# Patient Record
Sex: MALE | Race: BLACK OR AFRICAN AMERICAN | NOT HISPANIC OR LATINO | URBAN - METROPOLITAN AREA
[De-identification: names, ages, dates, MRNs, and addresses within clinical notes are randomized per-mention and may not be internally consistent; named-entity substitution may affect disease eponyms.]

---

## 2021-06-14 ENCOUNTER — OFFICE VISIT (OUTPATIENT)
Dept: URBAN - METROPOLITAN AREA CLINIC 25 | Facility: CLINIC | Age: 21
End: 2021-06-14

## 2021-06-14 NOTE — HPI-TODAY'S VISIT:
June 2021 visit: EGD in November 2014 was completely normal.  Path revealed nonspecific duodenitis, mild esophagitis with increased eosinophils in distal esophagus as well as proximal esophagus, no H. pylori identified.  Gastric emptying study in 2014 was abnormal by approximately 40 more minutes.  Barium swallow in 2014 revealed somewhat delayed gastric emptying but normal esophagus.

## 2022-08-17 ENCOUNTER — HOSPITAL ENCOUNTER (OUTPATIENT)
Dept: HOSPITAL 5 - ED | Age: 22
Setting detail: OBSERVATION
LOS: 1 days | Discharge: NURSING, REHAB OR LONG-TERM CARE | End: 2022-08-18
Attending: INTERNAL MEDICINE | Admitting: INTERNAL MEDICINE
Payer: MEDICARE

## 2022-08-17 DIAGNOSIS — E66.9: ICD-10-CM

## 2022-08-17 DIAGNOSIS — I10: ICD-10-CM

## 2022-08-17 DIAGNOSIS — I95.9: ICD-10-CM

## 2022-08-17 DIAGNOSIS — F29: ICD-10-CM

## 2022-08-17 DIAGNOSIS — F31.9: ICD-10-CM

## 2022-08-17 DIAGNOSIS — J45.909: ICD-10-CM

## 2022-08-17 DIAGNOSIS — F20.9: ICD-10-CM

## 2022-08-17 DIAGNOSIS — Z79.899: ICD-10-CM

## 2022-08-17 DIAGNOSIS — Z20.822: ICD-10-CM

## 2022-08-17 DIAGNOSIS — A41.9: Primary | ICD-10-CM

## 2022-08-17 DIAGNOSIS — J18.9: ICD-10-CM

## 2022-08-17 DIAGNOSIS — Z98.890: ICD-10-CM

## 2022-08-17 LAB
ALBUMIN SERPL-MCNC: 2.9 G/DL (ref 3.9–5)
ALT SERPL-CCNC: 16 UNITS/L (ref 7–56)
BASOPHILS # (AUTO): 0.1 K/MM3 (ref 0–0.1)
BASOPHILS NFR BLD AUTO: 0.4 % (ref 0–1.8)
BUN SERPL-MCNC: 8 MG/DL (ref 9–20)
BUN/CREAT SERPL: 8 %
CALCIUM SERPL-MCNC: 8.8 MG/DL (ref 8.4–10.2)
EOSINOPHIL # BLD AUTO: 0.1 K/MM3 (ref 0–0.4)
EOSINOPHIL NFR BLD AUTO: 0.7 % (ref 0–4.3)
HCT VFR BLD CALC: 36.4 % (ref 35.5–45.6)
HEMOLYSIS INDEX: 75
HGB BLD-MCNC: 12.2 GM/DL (ref 11.8–15.2)
LYMPHOCYTES # BLD AUTO: 1.6 K/MM3 (ref 1.2–5.4)
LYMPHOCYTES NFR BLD AUTO: 11.3 % (ref 13.4–35)
MCHC RBC AUTO-ENTMCNC: 34 % (ref 32–34)
MCV RBC AUTO: 86 FL (ref 84–94)
MONOCYTES # (AUTO): 1.4 K/MM3 (ref 0–0.8)
MONOCYTES % (AUTO): 10 % (ref 0–7.3)
PLATELET # BLD: 158 K/MM3 (ref 140–440)
RBC # BLD AUTO: 4.25 M/MM3 (ref 3.65–5.03)

## 2022-08-17 PROCEDURE — 87502 INFLUENZA DNA AMP PROBE: CPT

## 2022-08-17 PROCEDURE — U0003 INFECTIOUS AGENT DETECTION BY NUCLEIC ACID (DNA OR RNA); SEVERE ACUTE RESPIRATORY SYNDROME CORONAVIRUS 2 (SARS-COV-2) (CORONAVIRUS DISEASE [COVID-19]), AMPLIFIED PROBE TECHNIQUE, MAKING USE OF HIGH THROUGHPUT TECHNOLOGIES AS DESCRIBED BY CMS-2020-01-R: HCPCS

## 2022-08-17 PROCEDURE — 86901 BLOOD TYPING SEROLOGIC RH(D): CPT

## 2022-08-17 PROCEDURE — 81001 URINALYSIS AUTO W/SCOPE: CPT

## 2022-08-17 PROCEDURE — 96365 THER/PROPH/DIAG IV INF INIT: CPT

## 2022-08-17 PROCEDURE — 80178 ASSAY OF LITHIUM: CPT

## 2022-08-17 PROCEDURE — 36415 COLL VENOUS BLD VENIPUNCTURE: CPT

## 2022-08-17 PROCEDURE — 86900 BLOOD TYPING SEROLOGIC ABO: CPT

## 2022-08-17 PROCEDURE — 71045 X-RAY EXAM CHEST 1 VIEW: CPT

## 2022-08-17 PROCEDURE — 80164 ASSAY DIPROPYLACETIC ACD TOT: CPT

## 2022-08-17 PROCEDURE — 80053 COMPREHEN METABOLIC PANEL: CPT

## 2022-08-17 PROCEDURE — 87040 BLOOD CULTURE FOR BACTERIA: CPT

## 2022-08-17 PROCEDURE — G0378 HOSPITAL OBSERVATION PER HR: HCPCS

## 2022-08-17 PROCEDURE — 82140 ASSAY OF AMMONIA: CPT

## 2022-08-17 PROCEDURE — 96361 HYDRATE IV INFUSION ADD-ON: CPT

## 2022-08-17 PROCEDURE — 85025 COMPLETE CBC W/AUTO DIFF WBC: CPT

## 2022-08-17 PROCEDURE — 99284 EMERGENCY DEPT VISIT MOD MDM: CPT

## 2022-08-17 PROCEDURE — 86850 RBC ANTIBODY SCREEN: CPT

## 2022-08-17 NOTE — EMERGENCY DEPARTMENT REPORT
HPI





- General


Chief Complaint: Fever


Time Seen by Provider: 08/17/22 11:53





- HPI


HPI: 





Room 8








Patient is a 22-year-old male present with a chief complaint of fever.  Patient 

is currently at [facility] secondary to schizophrenia with persistent disor

ganized thought process.  Patient was found to develop a fever yesterday and has

been given Tylenol several times.  Patient was sent to the ED for further 

evaluation patient's fever.  When asked how the patient is feeling he states he 

feels "depleted."  The patient admits to a slight cough that is productive of 

yellow sputum.  Patient admits to rhinorrhea and nausea without vomiting.  The 

patient states he has been vaccinated against COVID receiving 2 doses





ED Past Medical Hx





- Past Medical History


Hx Hypertension: Yes


Hx Psychiatric Treatment: Yes (Schizophrenia, bipolar disorder)


Hx Asthma: Yes





- Surgical History


Additional Surgical History: Rotator cuff surgery, arch repair bilateral feet, 

T&A





ED Review of Systems


ROS: 


Stated complaint: FEVER


Other details as noted in HPI





Constitutional: fever, malaise


Eyes: denies: eye pain


ENT: denies: throat pain


Respiratory: cough


Cardiovascular: denies: chest pain


Endocrine: no symptoms reported


Gastrointestinal: nausea.  denies: vomiting


Genitourinary: denies: dysuria


Musculoskeletal: denies: back pain


Neurological: headache





Physical Exam





- Physical Exam


Vital Signs: 


                                   Vital Signs











  08/17/22





  11:43


 


Temperature 104.1 F H


 


Pulse Rate 60


 


Respiratory 14





Rate 


 


Blood Pressure 140/90





[Left] 


 


O2 Sat by Pulse 98





Oximetry 











Physical Exam: 





GENERAL: The patient is well-developed well-nourished male lying on stretcher 

not appearing to be in acute distress. []


HEENT: Normocephalic.  Atraumatic.  Extraocular motions are intact.  Patient has

 moist mucous membranes.


NECK: Supple.  No meningitic signs are noted.  There is no nuchal rigidity


CHEST/LUNGS: Clear to auscultation.  There is no respiratory distress noted.


HEART/CARDIOVASCULAR: Regular.  There is no tachycardia.  There is no gallop rub

 or murmur.


ABDOMEN: Abdomen is soft, with trace discomfort to palpation in the left lower 

quadrant.  Patient has normal bowel sounds.  There is no abdominal distention.


SKIN: There is no rash.  There is no edema.  There is no diaphoresis.


NEURO: The patient is awake, alert, and oriented.  The patient is cooperative.  

The patient has no focal neurologic deficits.  The patient has normal speech.  

Cranial nerves II through XII grossly intact.  GCS 15


MUSCULOSKELETAL:  There is no evidence of acute injury.





ED Course


                                   Vital Signs











  08/17/22





  11:43


 


Temperature 104.1 F H


 


Pulse Rate 60


 


Respiratory 14





Rate 


 


Blood Pressure 140/90





[Left] 


 


O2 Sat by Pulse 98





Oximetry 














ED Medical Decision Making





- Lab Data


Result diagrams: 


                                 08/17/22 13:37





                                 08/17/22 12:20





- Differential Diagnosis


URI, pneumonia, viral syndrome


Critical care attestation.: 


If time is entered above; I have spent that time in minutes in the direct care 

of this critically ill patient, excluding procedure time.








ED Disposition


Clinical Impression: 


 Sepsis associated hypotension





Disposition: 09 ADMITTED AS INPATIENT


Is pt being admited?: Yes


Does the pt Need Aspirin: No


Condition: Fair


Time of Disposition: 17:00 (Care transferred to hospitalist (Dr. Argueta))

## 2022-08-17 NOTE — XRAY REPORT
CHEST 1 VIEW 8/17/2022 12:54 PM



INDICATION / CLINICAL INFORMATION: Cough, fever.



COMPARISON: None available.



FINDINGS:



SUPPORT DEVICES: None.



HEART / MEDIASTINUM: No significant abnormality. 



LUNGS / PLEURA: No significant pulmonary or pleural abnormality. No pneumothorax. 



ADDITIONAL FINDINGS: No significant additional findings.



IMPRESSION:

1. No acute findings.



Signer Name: Blair López MD 

Signed: 8/17/2022 2:01 PM

Workstation Name: [id number]

## 2022-08-18 VITALS — SYSTOLIC BLOOD PRESSURE: 132 MMHG | DIASTOLIC BLOOD PRESSURE: 71 MMHG

## 2022-08-18 LAB
RBC #/AREA URNS HPF: 3 /HPF (ref 0–6)
WBC #/AREA URNS HPF: 5 /HPF (ref 0–6)

## 2022-08-18 NOTE — HISTORY AND PHYSICAL REPORT
History of Present Illness


Chief complaint: 





I have been coughing


History of present illness: 


23 YO Male [facility] resident under voluntary status with Obesity, HTN, 

Mild Intermittent Asthma, Bipolar Disorder, Schizophrenia presents to ED for 

evaluation.  Patient reports "I have been coughing".  Patient reports that over 

the past 2 days he has experienced objective fever, productive cough with 

yellowish sputum.  EMS was notified and upon arrival the patient was evaluated 

and subsequently transported to [facility] for further care and evaluation of the 

aforementioned symptoms.  The patient was seen and evaluated in the emergency 

department.  All lab and imaging studies reviewed.  Patient found to have fever 

of unknown origin suspected complicated by sepsis.  Patient admitted to medical 

floor and initiated on IV antibiotic therapy.  Patient denies chills, chest 

pain, palpitation, skin rash, recent contact, known exposure to COVID-19.  

Patient is currently vaccinated against COVID-19.








Past History


Past Medical History: hypertension, other (See HPI)


Past Surgical History: Other (Rotator cuff surgery)


Social history: single.  denies: smoking, alcohol abuse, prescription drug abuse


Family history: hypertension





Medications and Allergies


                                    Allergies











Allergy/AdvReac Type Severity Reaction Status Date / Time


 


No Known Allergies Allergy   Unverified 08/18/22 07:45











                                Home Medications











 Medication  Instructions  Recorded  Confirmed  Last Taken  Type


 


Albuterol Mdi (or & Nicu Only) 90 mcg INHALATION Q8H PRN 08/18/22 08/18/22 

Unknown History





[ProAir HFA Inhaler]     


 


Ciprofloxacin HCl 500 mg PO DAILY #3 08/18/22  Unknown Rx


 


Divalproex Sodium [Depakote] 500 mg PO BID 08/18/22 08/18/22 Unknown History


 


Lithium Carbonate 300 mg PO BID 08/18/22 08/18/22 Unknown History


 


Multivit-Min/Iron Fum/Folic AC 1 cap PO DAILY 08/18/22 08/18/22 Unknown History





[Multi-Vitamin-Minerals Tablet]     


 


OLANzapine [ZyPREXA] 5 mg PO QDAY 08/18/22 08/18/22 Unknown History


 


S-Adenosylmethionine Sul Tosyl 400 mg PO QDAY 08/18/22 08/18/22 Unknown History





[Anthony-E]     


 


Ziprasidone HCl [Geodon] 80 mg PO BID 08/18/22 08/18/22 Unknown History


 


fluvoxaMINE (NF) [Luvox (Nf) Tab] 50 mg PO QHS 08/18/22 08/18/22 Unknown History














Review of Systems


Constitutional: fever, no weight loss, no weight gain, no chills, no sweats


Ears, nose, mouth and throat: no ear pain, no tinnitis, no nose pain, no nasal 

discharge


Cardiovascular: no chest pain, no orthopnea, no palpitations, no rapid/irregular

heart beat, no edema


Respiratory: cough, no hemoptysis, no shortness of breath


Gastrointestinal: no abdominal pain, no nausea, no vomiting, no constipation


Genitourinary Male: no flank pain, no discharge, no urinary frequency, no 

urinary hesitancy


Rectal: no pain, no incontinence, no bleeding


Musculoskeletal: no neck pain, no arm numbness/tingling, no shooting leg pain, 

no leg numbness/tingling


Integumentary: no rash, no pruritis, no sores


Neurological: no transient paralysis, no numbness, no seizures, no ataxia


Psychiatric: no anxiety, no insomnia, no change in appetite, no suicidal 

ideation, no disorientation


Endocrine: no polyphagia, no nocturia


Allergic/Immunologic: no urticaria





Exam





- Constitutional


Vitals: 


                                        











Temp Pulse Resp BP Pulse Ox


 


 98.9 F   101 H  16   131/88   98 


 


 08/18/22 09:42  08/17/22 22:30  08/18/22 09:48  08/18/22 09:39  08/18/22 09:48











General appearance: Present: mild distress





- EENT


Eyes: Present: PERRL


ENT: hearing intact, clear oral mucosa





- Neck


Neck: Present: supple, normal ROM





- Respiratory


Respiratory effort: normal


Respiratory: bilateral: CTA





- Cardiovascular


Heart Sounds: Present: S1 & S2.  Absent: rub, click





- Extremities


Extremities: pulses symmetrical, No edema


Peripheral Pulses: within normal limits





- Abdominal


General gastrointestinal: Present: soft, non-tender, non-distended, normal bowel

sounds


Male genitourinary: Present: normal





- Integumentary


Integumentary: Present: clear, warm, dry





- Musculoskeletal


Musculoskeletal: gait normal, strength equal bilaterally





- Psychiatric


Psychiatric: appropriate mood/affect, intact judgment & insight





- Neurologic


Neurologic: CNII-XII intact, moves all extremities





Results





- Labs


CBC & Chem 7: 


                                 08/17/22 13:37





                                 08/17/22 12:20


Labs: 


                              Abnormal lab results











  08/17/22 08/17/22 08/17/22 Range/Units





  12:20 12:20 13:37 


 


WBC    14.4 H  (4.5-11.0)  K/mm3


 


Lymph % (Auto)    11.3 L  (13.4-35.0)  %


 


Mono % (Auto)    10.0 H  (0.0-7.3)  %


 


Mono # (Auto)    1.4 H  (0.0-0.8)  K/mm3


 


Seg Neutrophils %    77.6 H  (40.0-70.0)  %


 


Seg Neutrophils #    11.2 H  (1.8-7.7)  K/mm3


 


Sodium  136 L    (137-145)  mmol/L


 


Potassium  5.1 H    (3.6-5.0)  mmol/L


 


Carbon Dioxide  19 L    (22-30)  mmol/L


 


BUN  8 L    (9-20)  mg/dL


 


Glucose  104 H    ()  mg/dL


 


Lactic Acid   2.50 H*   (0.7-2.0)  mmol/L


 


Albumin  2.9 L    (3.9-5)  g/dL














Assessment and Plan





- Patient Problems


(1) Sepsis


Current Visit: Yes   Status: Suspected   


Plan to address problem: 


Chest x-ray, urinalysis, IV fluid resuscitation therapy, IV antibiotic therapy.








(2) Atypical pneumonia


Current Visit: Yes   Status: Acute   


Plan to address problem: 


chest x ray, IV antibiotic therapy








(3) Obesity


Current Visit: Yes   Status: Acute   


Qualifiers: 


   Body mass index: BMI 33.0-33.9 


Plan to address problem: 


Balanced diet, increase physical activity discharge.








(4) Bipolar disorder


Current Visit: Yes   Status: Acute   


Plan to address problem: 


Mental health team consulted.  Outpatient psychiatry follow-up.








(5) Schizophrenia


Current Visit: Yes   Status: Acute   


Plan to address problem: 


Outpatient psychiatry follow-up.  Continue medical management.








(6) DVT prophylaxis


Current Visit: Yes   Status: Acute   


Plan to address problem: 


SCD to bilateral lower extremities while in bed








(7) Advance care planning


Current Visit: Yes   Status: Acute   


Plan to address problem: 


Disease education conducted, care plan discussed, diagnoses discussed, prognosis

discussed, patient is full code, +30 minutes.








(8) Preventative health care


Current Visit: Yes   Status: Acute   


Plan to address problem: 


Patient counseled regarding risk factor reduction, safe driving, outpatient 

follow-up with primary care physician for all age and risk factor appropriate sc

reening test.  Outpatient psychiatry follow-up.  +30 minutes.

## 2022-08-18 NOTE — DISCHARGE SUMMARY
Providers





- Providers


Date of Admission: 


08/17/22 19:00





Attending physician: 


YISSEL WRIGHT





Primary care physician: 


PRIMARY CARE MD








Hospitalization


Condition: Fair


Hospital course: 


23 YO Male [facility] resident under voluntary status with Obesity, HTN, 

Mild Intermittent Asthma, Bipolar Disorder, Schizophrenia presents to ED for 

evaluation.  Patient reports "I have been coughing".  Patient reports that over 

the past 2 days he has experienced objective fever, productive cough with 

yellowish sputum.  EMS was notified and upon arrival the patient was evaluated 

and subsequently transported to [facility] for further care and evaluation of the 

aforementioned symptoms.  The patient was seen and evaluated in the emergency 

department.  All lab and imaging studies reviewed.  Patient found to have fever 

of unknown origin suspected complicated by sepsis.  Patient admitted to medical 

floor and initiated on IV antibiotic therapy.  Patient denies chills, chest 

pain, palpitation, skin rash, recent contact, known exposure to COVID-19.  

Patient is currently vaccinated against COVID-19.  Patient convalesced well 

during hospital course.  Patient underwent coronavirus testing and was found to 

be COVID-negative.  Patient medically optimized and back to usual state of 

health and subsequent discharged to [facility].  Patient seen and evaluated

prior to discharge no significant physical exam findings.  35 minutes dedicated 

to patient discharge and coordination of care.  Patient symptoms sound secondary

to systemic inflammatory response syndrome and symptoms resolved with therapy.





Disposition: [address] [place] [address]


Final Discharge Diagnosis (Prints w/discharge instructions): Systemic 

inflammatory response syndrome


Time spent for discharge: 35 minutes





Core Measure Documentation





- Palliative Care


Palliative Care/ Comfort Measures: Not Applicable





- Core Measures


Any of the following diagnoses?: none





Exam





- Constitutional


Vitals: 


                                        











Temp Pulse Resp BP Pulse Ox


 


 98.9 F   101 H  16   131/88   98 


 


 08/18/22 09:42  08/17/22 22:30  08/18/22 09:48  08/18/22 09:39  08/18/22 09:48











General appearance: Present: no acute distress, obese





- EENT


Eyes: Present: PERRL


ENT: hearing intact, clear oral mucosa





- Neck


Neck: Present: supple, normal ROM





- Respiratory


Respiratory effort: normal


Respiratory: bilateral: CTA





- Cardiovascular


Heart Sounds: Present: S1 & S2.  Absent: rub, click





- Extremities


Extremities: pulses symmetrical, No edema


Peripheral Pulses: within normal limits





- Abdominal


General gastrointestinal: Present: soft, non-tender, non-distended, normal bowel

sounds


Male genitourinary: Present: normal





- Integumentary


Integumentary: Present: clear, warm, dry





- Musculoskeletal


Musculoskeletal: gait normal, strength equal bilaterally





- Psychiatric


Psychiatric: appropriate mood/affect, intact judgment & insight





- Neurologic


Neurologic: CNII-XII intact, moves all extremities





Plan


Follow up with: 


PRIMARY CARE,MD [Primary Care Provider] - 7 Days


Prescriptions: 


Ciprofloxacin HCl 500 mg PO DAILY #3

## 2023-08-14 ENCOUNTER — DASHBOARD ENCOUNTERS (OUTPATIENT)
Age: 23
End: 2023-08-14

## 2023-08-14 ENCOUNTER — OFFICE VISIT (OUTPATIENT)
Dept: URBAN - METROPOLITAN AREA CLINIC 25 | Facility: CLINIC | Age: 23
End: 2023-08-14
Payer: COMMERCIAL

## 2023-08-14 ENCOUNTER — WEB ENCOUNTER (OUTPATIENT)
Dept: URBAN - METROPOLITAN AREA CLINIC 25 | Facility: CLINIC | Age: 23
End: 2023-08-14

## 2023-08-14 ENCOUNTER — LAB OUTSIDE AN ENCOUNTER (OUTPATIENT)
Dept: URBAN - METROPOLITAN AREA CLINIC 25 | Facility: CLINIC | Age: 23
End: 2023-08-14

## 2023-08-14 VITALS
SYSTOLIC BLOOD PRESSURE: 135 MMHG | HEART RATE: 124 BPM | WEIGHT: 315 LBS | TEMPERATURE: 97.9 F | DIASTOLIC BLOOD PRESSURE: 85 MMHG | HEIGHT: 70 IN | BODY MASS INDEX: 45.1 KG/M2

## 2023-08-14 DIAGNOSIS — R11.14 BILIOUS VOMITING WITH NAUSEA: ICD-10-CM

## 2023-08-14 DIAGNOSIS — E11.69 TYPE 2 DIABETES MELLITUS WITH OTHER SPECIFIED COMPLICATION: ICD-10-CM

## 2023-08-14 DIAGNOSIS — R12 HEARTBURN: ICD-10-CM

## 2023-08-14 DIAGNOSIS — R11.10 REGURGITATION: ICD-10-CM

## 2023-08-14 PROBLEM — 408512008: Status: ACTIVE | Noted: 2023-08-14

## 2023-08-14 PROCEDURE — 99204 OFFICE O/P NEW MOD 45 MIN: CPT | Performed by: INTERNAL MEDICINE

## 2023-08-14 RX ORDER — OMEPRAZOLE 40 MG/1
1 CAPSULE 30 MINUTES BEFORE MORNING MEAL CAPSULE, DELAYED RELEASE ORAL ONCE A DAY
Qty: 90 | Refills: 0 | OUTPATIENT
Start: 2023-08-14

## 2023-08-14 RX ORDER — GLIPIZIDE 10 MG/1
1 TABLET 30 MINUTES BEFORE BREAKFAST TABLET ORAL ONCE A DAY
Status: ACTIVE | COMMUNITY

## 2023-08-14 RX ORDER — DIVALPROEX SODIUM 500 MG/1
1 TABLET TABLET, DELAYED RELEASE ORAL ONCE A DAY
Status: ACTIVE | COMMUNITY

## 2023-08-14 NOTE — HPI-TODAY'S VISIT:
Aug 2023: DM - dx recently X 2 months ago. was on glizipide and ozempic. stopped taking glipizide last week after which his symptoms resolved. chronic GI issues, including heartburn / regurgitation . no prior egd. on psych meds.

## 2023-11-06 ENCOUNTER — OFFICE VISIT (OUTPATIENT)
Dept: URBAN - METROPOLITAN AREA CLINIC 25 | Facility: CLINIC | Age: 23
End: 2023-11-06

## 2023-11-06 RX ORDER — GLIPIZIDE 10 MG/1
1 TABLET 30 MINUTES BEFORE BREAKFAST TABLET ORAL ONCE A DAY
Status: ACTIVE | COMMUNITY

## 2023-11-06 RX ORDER — OMEPRAZOLE 40 MG/1
1 CAPSULE 30 MINUTES BEFORE MORNING MEAL CAPSULE, DELAYED RELEASE ORAL ONCE A DAY
Qty: 90 | Refills: 0 | Status: ACTIVE | COMMUNITY
Start: 2023-08-14

## 2023-11-06 RX ORDER — DIVALPROEX SODIUM 500 MG/1
1 TABLET TABLET, DELAYED RELEASE ORAL ONCE A DAY
Status: ACTIVE | COMMUNITY

## 2023-11-06 RX ORDER — OMEPRAZOLE 40 MG/1
1 CAPSULE 30 MINUTES BEFORE MORNING MEAL CAPSULE, DELAYED RELEASE ORAL ONCE A DAY
Qty: 90 | Refills: 0 | OUTPATIENT